# Patient Record
Sex: MALE | Race: WHITE | NOT HISPANIC OR LATINO | URBAN - METROPOLITAN AREA
[De-identification: names, ages, dates, MRNs, and addresses within clinical notes are randomized per-mention and may not be internally consistent; named-entity substitution may affect disease eponyms.]

---

## 2020-01-16 ENCOUNTER — EMERGENCY (EMERGENCY)
Facility: HOSPITAL | Age: 65
LOS: 1 days | Discharge: HOME | End: 2020-01-16
Admitting: EMERGENCY MEDICINE
Payer: MEDICAID

## 2020-01-16 VITALS
SYSTOLIC BLOOD PRESSURE: 127 MMHG | TEMPERATURE: 98 F | WEIGHT: 199.96 LBS | OXYGEN SATURATION: 94 % | DIASTOLIC BLOOD PRESSURE: 72 MMHG | HEART RATE: 77 BPM | RESPIRATION RATE: 20 BRPM

## 2020-01-16 PROCEDURE — 99284 EMERGENCY DEPT VISIT MOD MDM: CPT | Mod: 25

## 2020-01-16 PROCEDURE — 73590 X-RAY EXAM OF LOWER LEG: CPT | Mod: 26,LT

## 2020-01-16 PROCEDURE — 93971 EXTREMITY STUDY: CPT | Mod: 26,LT

## 2020-01-16 NOTE — ED ADULT TRIAGE NOTE - CHIEF COMPLAINT QUOTE
BIBA from home referred by pmd to r/o dvt of lle. +swelling +sob r/t copd, denies any chest pain. 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

## 2020-01-16 NOTE — ED PROVIDER NOTE - NSFOLLOWUPINSTRUCTIONS_ED_ALL_ED_FT
You need a repeat ultrasound in 7-10 days if swelling persists.    Elevate leg as much as possible.    Follow up with primary care provider.    Return for redness, fever, numbness or other concerns.

## 2020-01-16 NOTE — ED ADULT NURSE NOTE - NSIMPLEMENTINTERV_GEN_ALL_ED
Implemented All Fall Risk Interventions:  [place] to call system. Call bell, personal items and telephone within reach. Instruct patient to call for assistance. Room bathroom lighting operational. Non-slip footwear when patient is off stretcher. Physically safe environment: no spills, clutter or unnecessary equipment. Stretcher in lowest position, wheels locked, appropriate side rails in place. Provide visual cue, wrist band, yellow gown, etc. Monitor gait and stability. Monitor for mental status changes and reorient to person, place, and time. Review medications for side effects contributing to fall risk. Reinforce activity limits and safety measures with patient and family.

## 2020-01-16 NOTE — ED PROVIDER NOTE - TOBACCO USE
----- Message from Finesse Benson sent at 5/16/2017  4:03 PM PDT -----  Regarding: smoking cessation / pharmacy consult  García Landers,    Your patient would like to begin the Smoking Cessation Program but would need a referral due to her insurance with [facility].    Would you please write a referral so she can attend the next set of classes being offered here at [facility]?      If there are problems with her request please contact the patient at [phone] (home)     Thank you,  Finesse Benson   Unknown if ever smoked

## 2020-01-16 NOTE — ED PROVIDER NOTE - PATIENT PORTAL LINK FT
You can access the FollowMyHealth Patient Portal offered by [facility] by registering at the following website: [email or web address]. By joining [employer]’s FollowMyHealth portal, you will also be able to view your health information using other applications (apps) compatible with our system.

## 2020-01-16 NOTE — ED PROVIDER NOTE - MUSCULOSKELETAL MINIMAL EXAM
normal range of motion/+ 2 bilat pitting edema.  LLE:  chronic hyperpigmentation, hairless.  No TTP, no deformity.  pedal pulses intact.

## 2020-01-16 NOTE — ED PROVIDER NOTE - CLINICAL SUMMARY MEDICAL DECISION MAKING FREE TEXT BOX
65 y/o M presents to ED c/o atraumatic LLE swelling.  Pt well appearing VSS, NAD.  DVT u/s negative.  Xray shows hardware from remote surgery.  Hardware intact, no acute fracture.

## 2020-01-16 NOTE — ED PROVIDER NOTE - OBJECTIVE STATEMENT
65 y/o M with PMH of COPD presents to ED c/o atraumatic LLE swelling x 1 day.  Pt sent in to ED by PCP for r/o DVT.  Pt also

## 2020-01-25 DIAGNOSIS — M79.89 OTHER SPECIFIED SOFT TISSUE DISORDERS: ICD-10-CM

## 2023-01-31 NOTE — ED ADULT NURSE NOTE - NSFALLRSKINDICATORS_ED_ALL_ED
yes
until duration of hospital stay
byron 54 m with 3 weeks of rhomboid and neck pain worse after sleeping funny pain radiates down left arm no numbness or weaknes s - on exam pos [facility] pain with axial load and neck rotation - concern clincally for herniated disc vidhi lower cervical - stregth 5/5 lue  no releif w nsaids  and muiscle relaxer - will give course of steroids - and refer for imaging and fu at [facility]